# Patient Record
Sex: FEMALE | Race: ASIAN | NOT HISPANIC OR LATINO | URBAN - METROPOLITAN AREA
[De-identification: names, ages, dates, MRNs, and addresses within clinical notes are randomized per-mention and may not be internally consistent; named-entity substitution may affect disease eponyms.]

---

## 2019-06-26 ENCOUNTER — EMERGENCY (EMERGENCY)
Facility: HOSPITAL | Age: 69
LOS: 1 days | Discharge: HOME | End: 2019-06-26
Attending: EMERGENCY MEDICINE | Admitting: EMERGENCY MEDICINE
Payer: MEDICARE

## 2019-06-26 VITALS
OXYGEN SATURATION: 100 % | HEART RATE: 88 BPM | DIASTOLIC BLOOD PRESSURE: 86 MMHG | RESPIRATION RATE: 16 BRPM | SYSTOLIC BLOOD PRESSURE: 148 MMHG | TEMPERATURE: 98 F

## 2019-06-26 PROCEDURE — 99283 EMERGENCY DEPT VISIT LOW MDM: CPT

## 2019-06-26 RX ORDER — OXYCODONE AND ACETAMINOPHEN 5; 325 MG/1; MG/1
2 TABLET ORAL ONCE
Refills: 0 | Status: DISCONTINUED | OUTPATIENT
Start: 2019-06-26 | End: 2019-06-26

## 2019-06-26 RX ADMIN — OXYCODONE AND ACETAMINOPHEN 2 TABLET(S): 5; 325 TABLET ORAL at 11:22

## 2019-06-26 NOTE — ED PROVIDER NOTE - OBJECTIVE STATEMENT
pt c/o 3 weeks of shooting pain down the LLE, has been on steroid dose pack which helped, is scheduled for spinal injection but comes today because of too much pain, pt currently on diclofenac and cyclobenzaprine.  pt has appt with PMD today for set up of spinal injection.  pt also had outpatient ultrasound for r/o DVT which was negative.  pt denies any weakness, ambulating but with pain.  pt w h/o diabetes      Dr. Winn - PMD

## 2019-06-26 NOTE — ED PROVIDER NOTE - PHYSICAL EXAMINATION
Gen:  alert, awake, no acute distress  HEENT:  atraumatic head, airway clear, pupils equal and round  CV:  rrr, nl S1, S2, no m/r/g  Pulm:  lungs CTA b/l  Abd: s/nt/nd, +BS  Ext:  moving all extremities, focal ttp to R buttock  Neuro:  grossly intact, no focal deficits  Skin:  clear, dry, intact  Psych: AOx3, normal affect, no apparent risk to self or others

## 2019-06-26 NOTE — ED PROVIDER NOTE - NSFOLLOWUPINSTRUCTIONS_ED_ALL_ED_FT
-- Follow up with your primary care physician today as scheduled.    -- Return to the ER for worsening or persistent symptoms, and/or ANY NEW OR CONCERNING SYMPTOMS.    -- If you have difficulty following up, please call: [phone] to obtain a [facility] doctor or specialist who takes your insurance in your area.

## 2021-06-22 ENCOUNTER — OUTPATIENT (OUTPATIENT)
Dept: OUTPATIENT SERVICES | Facility: HOSPITAL | Age: 71
LOS: 1 days | Discharge: HOME | End: 2021-06-22
Payer: MEDICARE

## 2021-06-22 VITALS
RESPIRATION RATE: 18 BRPM | TEMPERATURE: 98 F | HEART RATE: 77 BPM | OXYGEN SATURATION: 97 % | HEIGHT: 64 IN | SYSTOLIC BLOOD PRESSURE: 148 MMHG | WEIGHT: 216.05 LBS | DIASTOLIC BLOOD PRESSURE: 72 MMHG

## 2021-06-22 DIAGNOSIS — Z98.49 CATARACT EXTRACTION STATUS, UNSPECIFIED EYE: Chronic | ICD-10-CM

## 2021-06-22 DIAGNOSIS — M17.12 UNILATERAL PRIMARY OSTEOARTHRITIS, LEFT KNEE: ICD-10-CM

## 2021-06-22 DIAGNOSIS — Z01.818 ENCOUNTER FOR OTHER PREPROCEDURAL EXAMINATION: ICD-10-CM

## 2021-06-22 DIAGNOSIS — E11.9 TYPE 2 DIABETES MELLITUS WITHOUT COMPLICATIONS: ICD-10-CM

## 2021-06-22 DIAGNOSIS — E78.5 HYPERLIPIDEMIA, UNSPECIFIED: ICD-10-CM

## 2021-06-22 LAB
A1C WITH ESTIMATED AVERAGE GLUCOSE RESULT: 8.5 % — HIGH (ref 4–5.6)
ANION GAP SERPL CALC-SCNC: 10 MMOL/L — SIGNIFICANT CHANGE UP (ref 5–17)
APTT BLD: 29.6 SEC — SIGNIFICANT CHANGE UP (ref 27.5–35.5)
BASOPHILS # BLD AUTO: 0.01 K/UL — SIGNIFICANT CHANGE UP (ref 0–0.2)
BASOPHILS NFR BLD AUTO: 0.1 % — SIGNIFICANT CHANGE UP (ref 0–2)
BUN SERPL-MCNC: 11 MG/DL — SIGNIFICANT CHANGE UP (ref 7–23)
CALCIUM SERPL-MCNC: 9.2 MG/DL — SIGNIFICANT CHANGE UP (ref 8.5–10.1)
CHLORIDE SERPL-SCNC: 103 MMOL/L — SIGNIFICANT CHANGE UP (ref 96–108)
CO2 SERPL-SCNC: 28 MMOL/L — SIGNIFICANT CHANGE UP (ref 22–31)
CREAT SERPL-MCNC: 0.76 MG/DL — SIGNIFICANT CHANGE UP (ref 0.5–1.3)
EOSINOPHIL # BLD AUTO: 0.08 K/UL — SIGNIFICANT CHANGE UP (ref 0–0.5)
EOSINOPHIL NFR BLD AUTO: 1.1 % — SIGNIFICANT CHANGE UP (ref 0–6)
ESTIMATED AVERAGE GLUCOSE: 197 MG/DL — HIGH (ref 68–114)
GLUCOSE SERPL-MCNC: 91 MG/DL — SIGNIFICANT CHANGE UP (ref 70–99)
HCT VFR BLD CALC: 38.3 % — SIGNIFICANT CHANGE UP (ref 34.5–45)
HGB BLD-MCNC: 12.4 G/DL — SIGNIFICANT CHANGE UP (ref 11.5–15.5)
IMM GRANULOCYTES NFR BLD AUTO: 0.3 % — SIGNIFICANT CHANGE UP (ref 0–1.5)
INR BLD: 1.03 RATIO — SIGNIFICANT CHANGE UP (ref 0.88–1.16)
LYMPHOCYTES # BLD AUTO: 2.8 K/UL — SIGNIFICANT CHANGE UP (ref 1–3.3)
LYMPHOCYTES # BLD AUTO: 40 % — SIGNIFICANT CHANGE UP (ref 13–44)
MCHC RBC-ENTMCNC: 28.2 PG — SIGNIFICANT CHANGE UP (ref 27–34)
MCHC RBC-ENTMCNC: 32.4 GM/DL — SIGNIFICANT CHANGE UP (ref 32–36)
MCV RBC AUTO: 87 FL — SIGNIFICANT CHANGE UP (ref 80–100)
MONOCYTES # BLD AUTO: 0.57 K/UL — SIGNIFICANT CHANGE UP (ref 0–0.9)
MONOCYTES NFR BLD AUTO: 8.1 % — SIGNIFICANT CHANGE UP (ref 2–14)
MRSA PCR RESULT.: SIGNIFICANT CHANGE UP
NEUTROPHILS # BLD AUTO: 3.52 K/UL — SIGNIFICANT CHANGE UP (ref 1.8–7.4)
NEUTROPHILS NFR BLD AUTO: 50.4 % — SIGNIFICANT CHANGE UP (ref 43–77)
NRBC # BLD: 0 /100 WBCS — SIGNIFICANT CHANGE UP (ref 0–0)
PLATELET # BLD AUTO: 215 K/UL — SIGNIFICANT CHANGE UP (ref 150–400)
POTASSIUM SERPL-MCNC: 4.5 MMOL/L — SIGNIFICANT CHANGE UP (ref 3.5–5.3)
POTASSIUM SERPL-SCNC: 4.5 MMOL/L — SIGNIFICANT CHANGE UP (ref 3.5–5.3)
PROTHROM AB SERPL-ACNC: 11.9 SEC — SIGNIFICANT CHANGE UP (ref 10.6–13.6)
RBC # BLD: 4.4 M/UL — SIGNIFICANT CHANGE UP (ref 3.8–5.2)
RBC # FLD: 14.1 % — SIGNIFICANT CHANGE UP (ref 10.3–14.5)
S AUREUS DNA NOSE QL NAA+PROBE: SIGNIFICANT CHANGE UP
SODIUM SERPL-SCNC: 141 MMOL/L — SIGNIFICANT CHANGE UP (ref 135–145)
WBC # BLD: 7 K/UL — SIGNIFICANT CHANGE UP (ref 3.8–10.5)
WBC # FLD AUTO: 7 K/UL — SIGNIFICANT CHANGE UP (ref 3.8–10.5)

## 2021-06-22 PROCEDURE — 73560 X-RAY EXAM OF KNEE 1 OR 2: CPT | Mod: 26,LT

## 2021-06-22 NOTE — PHYSICAL THERAPY INITIAL EVALUATION ADULT - TINETTI BALANCE TEST, REHAB EVAL
Sitting Balance - 1/1 , Rises From Chair -1 /2, Attempts to rise -1 /2 , Immediate Standing Balance -1 /2,  Standing Balance - 2/2, Nudged -  2/2, Eyes Closed - 1/1,  Turning 360 Deg - 1 /2, Sitting down -1 /2, Balance Score - 11/16

## 2021-06-22 NOTE — H&P PST ADULT - NSICDXPROBLEM_GEN_ALL_CORE_FT
PROBLEM DIAGNOSES  Problem: Osteoarthritis of left knee  Assessment and Plan: schedule for left knee arthoplasty    Problem: DM (diabetes mellitus)  Assessment and Plan: continue meds      Problem: HLD (hyperlipidemia)  Assessment and Plan: continue meds      Problem: Preoperative examination  Assessment and Plan: Assessment and Plan: labs - cbc,pt/ptt,bmp,t&s,nose cx,ekg  M/C required  preop 3 day hibiclens instruction reviewed and given .instructed on if  nose cx positive use mupuricin 5 days and checklist given  take routine meds DOS with sips of water. avoid NSAID and OTC supplements. verbalized understanding  information on proper nutrition , increase protein and better food choices provided in packet  anesthesiologist to review pst labs, ekg, medical clearances and optimization for surgery

## 2021-06-22 NOTE — H&P PST ADULT - ASSESSMENT
left knee osteoarthritis   CAPRINI SCORE    AGE RELATED RISK FACTORS                                                       MOBILITY RELATED FACTORS  [ ] Age 41-60 years                                            (1 Point)                  [ ] Bed rest                                                        (1 Point)  [x ] Age: 61-74 years                                           (2 Points)                [ ] Plaster cast                                                   (2 Points)  [ ] Age= 75 years                                              (3 Points)                 [ ] Bed bound for more than 72 hours                   (2 Points)    DISEASE RELATED RISK FACTORS                                               GENDER SPECIFIC FACTORS  [ ] Edema in the lower extremities                       (1 Point)                  [ ] Pregnancy                                                     (1 Point)  [ ] Varicose veins                                               (1 Point)                  [ ] Post-partum < 6 weeks                                   (1 Point)             x[ ] BMI > 25 Kg/m2                                            (1 Point)                  [ ] Hormonal therapy  or oral contraception            (1 Point)                 [ ] Sepsis (in the previous month)                        (1 Point)                  [ ] History of pregnancy complications  [ ] Pneumonia or serious lung disease                                               [ ] Unexplained or recurrent                       (1 Point)           (in the previous month)                               (1 Point)  [ ] Abnormal pulmonary function test                     (1 Point)                 SURGERY RELATED RISK FACTORS  [ ] Acute myocardial infarction                              (1 Point)                 [ ]  Section                                            (1 Point)  [ ] Congestive heart failure (in the previous month)  (1 Point)                 [ ] Minor surgery                                                 (1 Point)   [ ] Inflammatory bowel disease                             (1 Point)                 [ ] Arthroscopic surgery                                        (2 Points)  [ ] Central venous access                                    (2 Points)                [ ] General surgery lasting more than 45 minutes   (2 Points)       [ ] Stroke (in the previous month)                          (5 Points)               [ x] Elective arthroplasty                                        (5 Points)                                                                                                                                               HEMATOLOGY RELATED FACTORS                                                 TRAUMA RELATED RISK FACTORS  [ ] Prior episodes of VTE                                     (3 Points)                 [ ] Fracture of the hip, pelvis, or leg                       (5 Points)  [ ] Positive family history for VTE                         (3 Points)                 [ ] Acute spinal cord injury (in the previous month)  (5 Points)  [ ] Prothrombin 47694 A                                      (3 Points)                 [ ] Paralysis  (less than 1 month)                          (5 Points)  [ ] Factor V Leiden                                             (3 Points)                 [ ] Multiple Trauma within 1 month                         (5 Points)  [ ] Lupus anticoagulants                                     (3 Points)                                                           [ ] Anticardiolipin antibodies                                (3 Points)                                                       [ ] High homocysteine in the blood                      (3 Points)                                             [ ] Other congenital or acquired thrombophilia       (3 Points)                                                [ ] Heparin induced thrombocytopenia                  (3 Points)                                          Total Score [     8     ]  Caprini Score 0-2: Low risk, No VTE Prophylaxis required for most patient's, encourage ambulation  Caprini Score 3-6: At Risk, Pharmacologic VTE prophylaxis is indicated for most patients ( in the absence of a contraindication)  Caprini Score Greater than or = 7: High Risk , pharmacologic VTE is indicated for most patients ( in the absence of a contraindication)    Caprini score indicates that the patient is high risk for VTE event ( score 6 or greater). Surgical patient's in this group will benefit from both pharmacologic prophylaxis and intermittent compression devices . Surgical team will determine the balance between VTE  risk and bleeding risk and other clinical considerations

## 2021-06-22 NOTE — OCCUPATIONAL THERAPY INITIAL EVALUATION ADULT - ADDITIONAL COMMENTS
Pt lives in a private home c  and granddaughter (able to assist upon d/c) c 6 stairs to enter c bilateral handrails. Once inside, pt has a full flight to reach bedroom but will reside on the main level in living room post-operatively. Pt bathroom has walk-in shower, retractable shower head, and standard height toilet seat. Pt reported a 3:1 commode will not fit in downstairs bathroom, but will fit in the BR upstairs. Pt reported daily pain as 3/10 at rest and 8/10 c ambulation and stairs. Pt currently utilizes narcotics to alleviate pain. Pt lives in a private home c  and granddaughter (able to assist upon d/c) c 6 stairs to enter c bilateral handrails. Once inside, pt has a full flight to reach bedroom but will reside on the main level in living room post-operatively. Pt bathroom has walk-in shower, retractable shower head, and standard height toilet seat. Pt reported a 3:1 commode will not fit in downstairs bathroom, but will fit in the BR upstairs. Pt reported daily pain as 3/10 at rest and 8/10 c ambulation and stairs. Pt currently utilizes narcotics to alleviate pain. Pt is ambidextrous (alternates R/L hand depending on task), wears glasses for reading & distance, and currently does not drive.

## 2021-06-22 NOTE — H&P PST ADULT - NSICDXPASTMEDICALHX_GEN_ALL_CORE_FT
PAST MEDICAL HISTORY:  DM (diabetes mellitus)      PAST MEDICAL HISTORY:  DM (diabetes mellitus)     HLD (hyperlipidemia)

## 2021-06-22 NOTE — PHYSICAL THERAPY INITIAL EVALUATION ADULT - GENERAL OBSERVATIONS, REHAB EVAL
Patient was seen seated  in chair in PT/OT preoperative assessment room with swelling at blu ankle and feet and no apparent distress. Height:  5'5'     ; weight : 216    lbs. v

## 2021-06-22 NOTE — PHYSICAL THERAPY INITIAL EVALUATION ADULT - ADDITIONAL COMMENTS
There are 6 steps, c far blu rails, at the entry of the house and one flight od steps to negotiate to go to the second floor bedroom. However, pt will set up a bed in the living room and stay on the main floor after surgery.  Pt has a walk in shower c retractable shower head,  no grab bar, and regular toilet seat in BR. 3-1 commode will fit in BR on the second floor. Pt will purchase the raised toilet seat c arms for the main floor bath room. Average pain level at rest is 3/10. Pain increases to 8/10 c ambulation and stairs negotiation. Pt endorses taking narcotics for pain management. Pt has no experience of adverse effects with pain medication. Pt is not on out-pt physical therapy at present. There is no h/o fall but L  knee marcellus sometimes. Pt wears glasses for reading and distance and no need for hearing aid. Pt dose not drive at present.

## 2021-06-22 NOTE — PHYSICAL THERAPY INITIAL EVALUATION ADULT - PERTINENT HX OF CURRENT PROBLEM, REHAB EVAL
Chronic L knee pain and  limiting functional mobility. Pt is scheduled for elective L knee total joint replacement on 7/12/21  by  Dr. Hernandez.

## 2021-06-22 NOTE — PHYSICAL THERAPY INITIAL EVALUATION ADULT - GAIT DEVIATIONS NOTED, PT EVAL
c L knee in full extension all the times/decreased trang/increased time in double stance/decreased velocity of limb motion/decreased step length/decreased stride length/decreased weight-shifting ability

## 2021-06-22 NOTE — H&P PST ADULT - BIRTH SEX
[Mother] : mother [1%] : 1%  milk [Fruit] : fruit [Vegetables] : vegetables [Meat] : meat [Grains] : grains [Eggs] : eggs [Fish] : fish [Dairy] : dairy [Vitamins] : takes vitamins  [Eats healthy meals and snacks] : eats healthy meals and snacks [Eats meals with family] : eats meals with family [___ stools per day] : [unfilled]  stools per day [Toilet Trained] : toilet trained [In own bed] : In own bed [Sleeps ___ hours per night] : sleeps [unfilled] hours per night [Brushing teeth twice/d] : brushing teeth twice per day [Goes to dentist twice per year] : goes to dentist twice per year [Playtime (60 min/d)] : playtime 60 min a day [Appropiate parent-child-sibling interaction] : appropriate parent-child-sibling interaction [Does chores when asked] : does chores when asked [Has Friends] : has friends [Grade ___] : Grade [unfilled] [Adequate social interactions] : adequate social interactions [Adequate behavior] : adequate behavior [Adequate performance] : adequate performance [Adequate attention] : adequate attention [No difficulties with Homework] : no difficulties with homework [Appropriately restrained in motor vehicle] : appropriately restrained in motor vehicle Female [Supervised outdoor play] : supervised outdoor play [Supervised around water] : supervised around water [Wears helmet and pads] : wears helmet and pads [Parent knows child's friends] : parent knows child's friends [Parent discusses safety rules regarding adults] : parent discusses safety rules regarding adults [Family discusses home emergency plan] : family discusses home emergency plan [Monitored computer use] : monitored computer use [Up to date] : Up to date [Gun in Home] : no gun in home [Cigarette smoke exposure] : no cigarette smoke exposure [FreeTextEntry8] : no enuresis

## 2021-06-22 NOTE — OCCUPATIONAL THERAPY INITIAL EVALUATION ADULT - ANTICIPATED DISCHARGE DISPOSITION, OT EVAL
Home with home OT for home safety evaluation and to improve functional ADLs and transfers/mobility. Recommending rolling walker and 3:1 commode for safe transfers and ADL management.

## 2021-06-22 NOTE — PHYSICAL THERAPY INITIAL EVALUATION ADULT - TINETTI GAIT TEST, REHAB EVAL
Indication of gait -0/1,   Step Length and height -1/2,   Foot Clearance -2/2,   Step Symmetry - 0/1,  Step Continuity -1/1,   Path -1/ 2,   Trunk -1/2,   Walking Time -1/1,   Total Score -7 /12

## 2021-06-22 NOTE — PHYSICAL THERAPY INITIAL EVALUATION ADULT - LIVES WITH, PROFILE
and granddaughter in a private house.  will be available to assist pt in post -op care upon discharge home./spouse

## 2021-06-22 NOTE — H&P PST ADULT - HISTORY OF PRESENT ILLNESS
70 yo female, PMHx - dm, hld  c/o left knee pain secondary to osteoarthritis - scheduled for left knee arthroplasty    goal: to walk without pain    denies recent travels in the past 30 days. No fever, SOB, cough, flu like symptoms or body rash- covid screen  completed covid19 vaccine

## 2021-06-22 NOTE — PHYSICAL THERAPY INITIAL EVALUATION ADULT - IMPAIRMENTS FOUND, PT EVAL
aerobic capacity/endurance/ergonomics and body mechanics/gait, locomotion, and balance/joint integrity and mobility/muscle strength/posture/ROM

## 2021-06-22 NOTE — OCCUPATIONAL THERAPY INITIAL EVALUATION ADULT - GENERAL OBSERVATIONS, REHAB EVAL
Pt is a 70 y/o female slated for elective surgery for right TKR with MD Hernandez on 7/12/21 due to OA and chronic pain. Pt denied any falls in the past 3-6 months. Pt reported occasional buckling in LLE and numbness in feet due to dx of diabetes. Pt is a 70 y/o female slated for elective surgery for left TKR with MD Hernandez on 7/12/21 due to OA and chronic pain. Pt denied any falls in the past 3-6 months. Pt reported occasional buckling in LLE and numbness in feet due to dx of diabetes.

## 2021-07-06 PROCEDURE — 93010 ELECTROCARDIOGRAM REPORT: CPT

## 2021-07-28 PROBLEM — E11.9 TYPE 2 DIABETES MELLITUS WITHOUT COMPLICATIONS: Chronic | Status: ACTIVE | Noted: 2021-06-22

## 2021-07-28 PROBLEM — E78.5 HYPERLIPIDEMIA, UNSPECIFIED: Chronic | Status: ACTIVE | Noted: 2021-06-22

## 2021-07-29 ENCOUNTER — OUTPATIENT (OUTPATIENT)
Dept: OUTPATIENT SERVICES | Facility: HOSPITAL | Age: 71
LOS: 1 days | Discharge: HOME | End: 2021-07-29

## 2021-07-29 VITALS
HEIGHT: 60 IN | OXYGEN SATURATION: 97 % | DIASTOLIC BLOOD PRESSURE: 85 MMHG | HEART RATE: 76 BPM | TEMPERATURE: 98 F | SYSTOLIC BLOOD PRESSURE: 174 MMHG | RESPIRATION RATE: 16 BRPM | WEIGHT: 218.7 LBS

## 2021-07-29 DIAGNOSIS — Z98.49 CATARACT EXTRACTION STATUS, UNSPECIFIED EYE: Chronic | ICD-10-CM

## 2021-07-29 DIAGNOSIS — M17.12 UNILATERAL PRIMARY OSTEOARTHRITIS, LEFT KNEE: ICD-10-CM

## 2021-07-29 DIAGNOSIS — Z01.818 ENCOUNTER FOR OTHER PREPROCEDURAL EXAMINATION: ICD-10-CM

## 2021-07-29 DIAGNOSIS — E11.9 TYPE 2 DIABETES MELLITUS WITHOUT COMPLICATIONS: ICD-10-CM

## 2021-07-29 DIAGNOSIS — E78.5 HYPERLIPIDEMIA, UNSPECIFIED: ICD-10-CM

## 2021-07-29 LAB
A1C WITH ESTIMATED AVERAGE GLUCOSE RESULT: 7.5 % — HIGH (ref 4–5.6)
ANION GAP SERPL CALC-SCNC: 5 MMOL/L — SIGNIFICANT CHANGE UP (ref 5–17)
APTT BLD: 32.5 SEC — SIGNIFICANT CHANGE UP (ref 27.5–35.5)
BASOPHILS # BLD AUTO: 0.01 K/UL — SIGNIFICANT CHANGE UP (ref 0–0.2)
BASOPHILS NFR BLD AUTO: 0.1 % — SIGNIFICANT CHANGE UP (ref 0–2)
BUN SERPL-MCNC: 13 MG/DL — SIGNIFICANT CHANGE UP (ref 7–23)
CALCIUM SERPL-MCNC: 9.2 MG/DL — SIGNIFICANT CHANGE UP (ref 8.5–10.1)
CHLORIDE SERPL-SCNC: 107 MMOL/L — SIGNIFICANT CHANGE UP (ref 96–108)
CO2 SERPL-SCNC: 29 MMOL/L — SIGNIFICANT CHANGE UP (ref 22–31)
CREAT SERPL-MCNC: 0.84 MG/DL — SIGNIFICANT CHANGE UP (ref 0.5–1.3)
EOSINOPHIL # BLD AUTO: 0.02 K/UL — SIGNIFICANT CHANGE UP (ref 0–0.5)
EOSINOPHIL NFR BLD AUTO: 0.3 % — SIGNIFICANT CHANGE UP (ref 0–6)
ESTIMATED AVERAGE GLUCOSE: 169 MG/DL — HIGH (ref 68–114)
GLUCOSE SERPL-MCNC: 107 MG/DL — HIGH (ref 70–99)
HCT VFR BLD CALC: 40.6 % — SIGNIFICANT CHANGE UP (ref 34.5–45)
HGB BLD-MCNC: 12.7 G/DL — SIGNIFICANT CHANGE UP (ref 11.5–15.5)
IMM GRANULOCYTES NFR BLD AUTO: 0.3 % — SIGNIFICANT CHANGE UP (ref 0–1.5)
INR BLD: 1.01 RATIO — SIGNIFICANT CHANGE UP (ref 0.88–1.16)
LYMPHOCYTES # BLD AUTO: 1.75 K/UL — SIGNIFICANT CHANGE UP (ref 1–3.3)
LYMPHOCYTES # BLD AUTO: 25.7 % — SIGNIFICANT CHANGE UP (ref 13–44)
MCHC RBC-ENTMCNC: 28.4 PG — SIGNIFICANT CHANGE UP (ref 27–34)
MCHC RBC-ENTMCNC: 31.3 GM/DL — LOW (ref 32–36)
MCV RBC AUTO: 90.8 FL — SIGNIFICANT CHANGE UP (ref 80–100)
MONOCYTES # BLD AUTO: 0.37 K/UL — SIGNIFICANT CHANGE UP (ref 0–0.9)
MONOCYTES NFR BLD AUTO: 5.4 % — SIGNIFICANT CHANGE UP (ref 2–14)
MRSA PCR RESULT.: SIGNIFICANT CHANGE UP
NEUTROPHILS # BLD AUTO: 4.64 K/UL — SIGNIFICANT CHANGE UP (ref 1.8–7.4)
NEUTROPHILS NFR BLD AUTO: 68.2 % — SIGNIFICANT CHANGE UP (ref 43–77)
NRBC # BLD: 0 /100 WBCS — SIGNIFICANT CHANGE UP (ref 0–0)
PLATELET # BLD AUTO: 231 K/UL — SIGNIFICANT CHANGE UP (ref 150–400)
POTASSIUM SERPL-MCNC: 4.8 MMOL/L — SIGNIFICANT CHANGE UP (ref 3.5–5.3)
POTASSIUM SERPL-SCNC: 4.8 MMOL/L — SIGNIFICANT CHANGE UP (ref 3.5–5.3)
PROTHROM AB SERPL-ACNC: 11.7 SEC — SIGNIFICANT CHANGE UP (ref 10.6–13.6)
RBC # BLD: 4.47 M/UL — SIGNIFICANT CHANGE UP (ref 3.8–5.2)
RBC # FLD: 14.5 % — SIGNIFICANT CHANGE UP (ref 10.3–14.5)
S AUREUS DNA NOSE QL NAA+PROBE: SIGNIFICANT CHANGE UP
SODIUM SERPL-SCNC: 141 MMOL/L — SIGNIFICANT CHANGE UP (ref 135–145)
WBC # BLD: 6.81 K/UL — SIGNIFICANT CHANGE UP (ref 3.8–10.5)
WBC # FLD AUTO: 6.81 K/UL — SIGNIFICANT CHANGE UP (ref 3.8–10.5)

## 2021-07-29 NOTE — OCCUPATIONAL THERAPY INITIAL EVALUATION ADULT - ANTICIPATED DISCHARGE DISPOSITION, OT EVAL
Home with home OT for home safety evaluation and to improve functional ADLs and transfers/mobility. Pt has rolling walker, 3:1 commode and raised toilet seat with arms.

## 2021-07-29 NOTE — H&P PST ADULT - IS PATIENT PREGNANT?
Treating ADHD: Learning New Behaviors  A child with ADHD often acts up and tunes out. But you can show your child new ways to react to the world. This process takes time and practice. Working with a counselor may help.    Coping skills  What things upset your child? Perhaps having to do chores or share toys sanders poor behavior. Try to work with your child each day. Assign a simple task. Or talk with your child about the tips below. Show your child how to respond to frustration and anger in useful ways. This can help him or her learn self-control.  Reinforcing success  Children with ADHD have trouble learning from past events. Positive feedback helps make lessons stick. Offer praise when a job is well done. This helps your child stacey the moment in his or her mind. Place a sticker on a reward chart to celebrate each success.  Parents role  Here are some ways you can help:  · Teach coping skills after your child has taken a dose of medication. Learning is more likely to occur at such times.  · Praise your childs success. Offer a smile and a hug, a positive comment, or a small reward.  · Set clear rules. Explain what will be taken away if those rules are not followed. Then, follow through.  · Try to stick to a routine. Prepare your child for any change in that routine.  · Help your child stay focused. For instance, avoid crowded, noisy places if they bother your child. Also, limit choices.  Childs role  Here are some hints for your child:  · Try out new ways of dealing with people and places that bother you. When you are upset, you might talk, draw, write, throw a ball, or spend some time alone.  · Act like a STAR: Stop, Think, Act, and then Review.  Date Last Reviewed: 12/22/2014  © 1055-1498 [employer]. [address] [place] [address], [place] [address]. All rights reserved. This information is not intended as a substitute for professional medical care. Always follow your healthcare professional's  instructions.         no

## 2021-07-29 NOTE — OCCUPATIONAL THERAPY INITIAL EVALUATION ADULT - ADDITIONAL COMMENTS
Pt lives in a private home c  and granddaughter (able to assist upon d/c) c 6 stairs to enter c bilateral handrails. Once inside, pt has a full flight c B/L close handrails to reach bedroom but will reside on the main level in living room post-operatively. Pt bathroom has walk-in shower, fixed shower head, and standard height toilet seat. Pt reported she purchased raised toilet seat c arms for down stairs bathroom and use 3:1 commode upstairs if able. Pt reported daily pain as 3/10 at rest and 8/10 c ambulation and stairs. Pt uses rest to alleviate pain at this time does no utilize narcotics. Pt is ambidextrous (alternates R/L hand depending on task), wears glasses for reading & distance, and currently does not drive.

## 2021-07-29 NOTE — H&P PST ADULT - HISTORY OF PRESENT ILLNESS
70 yo female, PMHx - dm, hld  c/o left knee pain secondary to osteoarthritis - scheduled for left knee arthroplasty    goal: to walk without pain    denies recent travels in the past 30 days. No fever, SOB, cough, flu like symptoms or body rash- covid screen  completed covid19 vaccine   72 yo female, PMHx - dm, hld  c/o left knee pain secondary to osteoarthritis - scheduled for left knee arthroplasty  goal: to walk without pain  denies recent travels in the past 30 days. No fever, SOB, cough, flu like symptoms or body rash- covid screen  completed covid19 vaccine  patient  was scheduled for surgery in jUly cancelled secondarty to uncontrolled dm was started on insulin . patient stated she was light headed this morning  checked her sugar and it was 45 at home - she ate a piece of candy and toast . she felt abit dizzy here  was give orange juice and crackers - blood work done . spoke with patient  PMD Dr. Rea informed and advised  to decrease insulin dose  which was explained to patient .

## 2021-07-29 NOTE — H&P PST ADULT - ATTENDING COMMENTS
I WENT OVER ALL POSSIBLE RISKS OF LEFT TKA.  PT WISHES TO PROCEED NO GUARANTEES MADE ALL QUESTIONS ANSWERED

## 2021-07-29 NOTE — H&P PST ADULT - ASSESSMENT
left knee osteoarthritis   CAPRINI SCORE    AGE RELATED RISK FACTORS                                                       MOBILITY RELATED FACTORS  [ ] Age 41-60 years                                            (1 Point)                  [ ] Bed rest                                                        (1 Point)  [x ] Age: 61-74 years                                           (2 Points)                [ ] Plaster cast                                                   (2 Points)  [ ] Age= 75 years                                              (3 Points)                 [ ] Bed bound for more than 72 hours                   (2 Points)    DISEASE RELATED RISK FACTORS                                               GENDER SPECIFIC FACTORS  [ ] Edema in the lower extremities                       (1 Point)                  [ ] Pregnancy                                                     (1 Point)  [ ] Varicose veins                                               (1 Point)                  [ ] Post-partum < 6 weeks                                   (1 Point)             x[ ] BMI > 25 Kg/m2                                            (1 Point)                  [ ] Hormonal therapy  or oral contraception            (1 Point)                 [ ] Sepsis (in the previous month)                        (1 Point)                  [ ] History of pregnancy complications  [ ] Pneumonia or serious lung disease                                               [ ] Unexplained or recurrent                       (1 Point)           (in the previous month)                               (1 Point)  [ ] Abnormal pulmonary function test                     (1 Point)                 SURGERY RELATED RISK FACTORS  [ ] Acute myocardial infarction                              (1 Point)                 [ ]  Section                                            (1 Point)  [ ] Congestive heart failure (in the previous month)  (1 Point)                 [ ] Minor surgery                                                 (1 Point)   [ ] Inflammatory bowel disease                             (1 Point)                 [ ] Arthroscopic surgery                                        (2 Points)  [ ] Central venous access                                    (2 Points)                [ ] General surgery lasting more than 45 minutes   (2 Points)       [ ] Stroke (in the previous month)                          (5 Points)               [ x] Elective arthroplasty                                        (5 Points)                                                                                                                                               HEMATOLOGY RELATED FACTORS                                                 TRAUMA RELATED RISK FACTORS  [ ] Prior episodes of VTE                                     (3 Points)                 [ ] Fracture of the hip, pelvis, or leg                       (5 Points)  [ ] Positive family history for VTE                         (3 Points)                 [ ] Acute spinal cord injury (in the previous month)  (5 Points)  [ ] Prothrombin 15410 A                                      (3 Points)                 [ ] Paralysis  (less than 1 month)                          (5 Points)  [ ] Factor V Leiden                                             (3 Points)                 [ ] Multiple Trauma within 1 month                         (5 Points)  [ ] Lupus anticoagulants                                     (3 Points)                                                           [ ] Anticardiolipin antibodies                                (3 Points)                                                       [ ] High homocysteine in the blood                      (3 Points)                                             [ ] Other congenital or acquired thrombophilia       (3 Points)                                                [ ] Heparin induced thrombocytopenia                  (3 Points)                                          Total Score [     8     ]  Caprini Score 0-2: Low risk, No VTE Prophylaxis required for most patient's, encourage ambulation  Caprini Score 3-6: At Risk, Pharmacologic VTE prophylaxis is indicated for most patients ( in the absence of a contraindication)  Caprini Score Greater than or = 7: High Risk , pharmacologic VTE is indicated for most patients ( in the absence of a contraindication)    Caprini score indicates that the patient is high risk for VTE event ( score 6 or greater). Surgical patient's in this group will benefit from both pharmacologic prophylaxis and intermittent compression devices . Surgical team will determine the balance between VTE  risk and bleeding risk and other clinical considerations

## 2021-07-29 NOTE — OCCUPATIONAL THERAPY INITIAL EVALUATION ADULT - RANGE OF MOTION EXAMINATION, LOWER EXTREMITY
Right LE Active ROM was WFL   (within functional limits); Left LE AROM decreased grossly (0) swallows foods/liquids without difficulty

## 2021-07-29 NOTE — OCCUPATIONAL THERAPY INITIAL EVALUATION ADULT - GENERAL OBSERVATIONS, REHAB EVAL
Pt is a 72 y/o female slated for elective surgery for left TKR with MD Hernandez on 7/12/21 due to OA and chronic pain. Pt denied any falls in the past 3-6 months. Pt reported occasional buckling in LLE and numbness in L foot due to dx of diabetes. Pt is a 70 y/o female slated for elective surgery for left TKR with MD Hernandez on 8/09/21 due to OA and chronic pain. Pt denied any falls in the past 3-6 months. Pt reported occasional buckling in LLE and numbness in L foot due to dx of diabetes.

## 2021-07-29 NOTE — H&P PST ADULT - NSICDXPROBLEM_GEN_ALL_CORE_FT
PROBLEM DIAGNOSES  Problem: Osteoarthritis of left knee  Assessment and Plan: schedule for left knee arthoplasty    Problem: DM (diabetes mellitus)  Assessment and Plan: continue meds      Problem: HLD (hyperlipidemia)  Assessment and Plan: continue meds      Problem: Preoperative examination  Assessment and Plan: Assessment and Plan: labs - cbc,pt/ptt,bmp,t&s,nose cx,ekg  M/C required  preop 3 day hibiclens instruction reviewed and given .instructed on if  nose cx positive use mupuricin 5 days and checklist given  take routine meds DOS with sips of water. avoid NSAID and OTC supplements. verbalized understanding  information on proper nutrition , increase protein and better food choices provided in packet  anesthesiologist to review pst labs, ekg, medical clearances and optimization for surgery         PROBLEM DIAGNOSES  Problem: Osteoarthritis of left knee  Assessment and Plan: scheduled for left knee arthroplasty    Problem: HLD (hyperlipidemia)  Assessment and Plan: continue meds      Problem: Preoperative examination  Assessment and Plan: Assessment and Plan: labs - cbc,pt/ptt,bmp,t&s,nose cx,ekg  M/C required  preop 3 day hibiclens instruction reviewed and given .instructed on if  nose cx positive use mupuricin 5 days and checklist given  take routine meds DOS with sips of water. avoid NSAID and OTC supplements. verbalized understanding  information on proper nutrition , increase protein and better food choices provided in packet  anesthesiologist to review pst labs, ekg, medical clearances and optimization for surgery      Problem: DM (diabetes mellitus)  Assessment and Plan: basaglar dose reduced to 30 units twice a day

## 2021-07-29 NOTE — H&P PST ADULT - NSANTHOSAYNRD_GEN_A_CORE
No. LARA screening performed.  STOP BANG Legend: 0-2 = LOW Risk; 3-4 = INTERMEDIATE Risk; 5-8 = HIGH Risk

## 2021-08-06 ENCOUNTER — OUTPATIENT (OUTPATIENT)
Dept: OUTPATIENT SERVICES | Facility: HOSPITAL | Age: 71
LOS: 1 days | Discharge: HOME | End: 2021-08-06

## 2021-08-06 DIAGNOSIS — U07.1 COVID-19: ICD-10-CM

## 2021-08-06 DIAGNOSIS — Z98.49 CATARACT EXTRACTION STATUS, UNSPECIFIED EYE: Chronic | ICD-10-CM

## 2021-08-06 LAB
FLUAV AG NPH QL: SIGNIFICANT CHANGE UP
FLUBV AG NPH QL: SIGNIFICANT CHANGE UP
SARS-COV-2 RNA SPEC QL NAA+PROBE: SIGNIFICANT CHANGE UP

## 2021-08-09 ENCOUNTER — INPATIENT (INPATIENT)
Facility: HOSPITAL | Age: 71
LOS: 0 days | Discharge: HOME HEALTH | End: 2021-08-10
Attending: ORTHOPAEDIC SURGERY | Admitting: ORTHOPAEDIC SURGERY
Payer: MEDICARE

## 2021-08-09 VITALS
HEIGHT: 64 IN | SYSTOLIC BLOOD PRESSURE: 146 MMHG | WEIGHT: 216.05 LBS | TEMPERATURE: 98 F | RESPIRATION RATE: 17 BRPM | HEART RATE: 73 BPM | DIASTOLIC BLOOD PRESSURE: 82 MMHG | OXYGEN SATURATION: 97 %

## 2021-08-09 DIAGNOSIS — Z98.49 CATARACT EXTRACTION STATUS, UNSPECIFIED EYE: Chronic | ICD-10-CM

## 2021-08-09 DIAGNOSIS — M17.12 UNILATERAL PRIMARY OSTEOARTHRITIS, LEFT KNEE: ICD-10-CM

## 2021-08-09 LAB
ANION GAP SERPL CALC-SCNC: 6 MMOL/L — SIGNIFICANT CHANGE UP (ref 5–17)
BUN SERPL-MCNC: 10 MG/DL — SIGNIFICANT CHANGE UP (ref 7–23)
CALCIUM SERPL-MCNC: 8.5 MG/DL — SIGNIFICANT CHANGE UP (ref 8.5–10.1)
CHLORIDE SERPL-SCNC: 108 MMOL/L — SIGNIFICANT CHANGE UP (ref 96–108)
CO2 SERPL-SCNC: 27 MMOL/L — SIGNIFICANT CHANGE UP (ref 22–31)
CREAT SERPL-MCNC: 0.71 MG/DL — SIGNIFICANT CHANGE UP (ref 0.5–1.3)
FLUAV AG NPH QL: SIGNIFICANT CHANGE UP
FLUBV AG NPH QL: SIGNIFICANT CHANGE UP
GLUCOSE BLDC GLUCOMTR-MCNC: 238 MG/DL — HIGH (ref 70–99)
GLUCOSE BLDC GLUCOMTR-MCNC: 285 MG/DL — HIGH (ref 70–99)
GLUCOSE BLDC GLUCOMTR-MCNC: 289 MG/DL — HIGH (ref 70–99)
GLUCOSE BLDC GLUCOMTR-MCNC: 41 MG/DL — CRITICAL LOW (ref 70–99)
GLUCOSE BLDC GLUCOMTR-MCNC: 46 MG/DL — CRITICAL LOW (ref 70–99)
GLUCOSE BLDC GLUCOMTR-MCNC: 51 MG/DL — CRITICAL LOW (ref 70–99)
GLUCOSE BLDC GLUCOMTR-MCNC: 60 MG/DL — LOW (ref 70–99)
GLUCOSE BLDC GLUCOMTR-MCNC: 73 MG/DL — SIGNIFICANT CHANGE UP (ref 70–99)
GLUCOSE SERPL-MCNC: 76 MG/DL — SIGNIFICANT CHANGE UP (ref 70–99)
HCT VFR BLD CALC: 37.3 % — SIGNIFICANT CHANGE UP (ref 34.5–45)
HGB BLD-MCNC: 11.5 G/DL — SIGNIFICANT CHANGE UP (ref 11.5–15.5)
MCHC RBC-ENTMCNC: 27.9 PG — SIGNIFICANT CHANGE UP (ref 27–34)
MCHC RBC-ENTMCNC: 30.8 GM/DL — LOW (ref 32–36)
MCV RBC AUTO: 90.5 FL — SIGNIFICANT CHANGE UP (ref 80–100)
NRBC # BLD: 0 /100 WBCS — SIGNIFICANT CHANGE UP (ref 0–0)
PLATELET # BLD AUTO: 192 K/UL — SIGNIFICANT CHANGE UP (ref 150–400)
POTASSIUM SERPL-MCNC: 5 MMOL/L — SIGNIFICANT CHANGE UP (ref 3.5–5.3)
POTASSIUM SERPL-SCNC: 5 MMOL/L — SIGNIFICANT CHANGE UP (ref 3.5–5.3)
RBC # BLD: 4.12 M/UL — SIGNIFICANT CHANGE UP (ref 3.8–5.2)
RBC # FLD: 14.3 % — SIGNIFICANT CHANGE UP (ref 10.3–14.5)
SARS-COV-2 RNA SPEC QL NAA+PROBE: SIGNIFICANT CHANGE UP
SODIUM SERPL-SCNC: 141 MMOL/L — SIGNIFICANT CHANGE UP (ref 135–145)
WBC # BLD: 9.69 K/UL — SIGNIFICANT CHANGE UP (ref 3.8–10.5)
WBC # FLD AUTO: 9.69 K/UL — SIGNIFICANT CHANGE UP (ref 3.8–10.5)

## 2021-08-09 PROCEDURE — 88305 TISSUE EXAM BY PATHOLOGIST: CPT | Mod: 26

## 2021-08-09 PROCEDURE — 88311 DECALCIFY TISSUE: CPT | Mod: 26

## 2021-08-09 PROCEDURE — 73560 X-RAY EXAM OF KNEE 1 OR 2: CPT | Mod: 26,LT

## 2021-08-09 RX ORDER — HYDROMORPHONE HYDROCHLORIDE 2 MG/ML
0.5 INJECTION INTRAMUSCULAR; INTRAVENOUS; SUBCUTANEOUS ONCE
Refills: 0 | Status: DISCONTINUED | OUTPATIENT
Start: 2021-08-09 | End: 2021-08-10

## 2021-08-09 RX ORDER — ATORVASTATIN CALCIUM 80 MG/1
20 TABLET, FILM COATED ORAL AT BEDTIME
Refills: 0 | Status: DISCONTINUED | OUTPATIENT
Start: 2021-08-09 | End: 2021-08-10

## 2021-08-09 RX ORDER — SODIUM CHLORIDE 9 MG/ML
1000 INJECTION, SOLUTION INTRAVENOUS
Refills: 0 | Status: DISCONTINUED | OUTPATIENT
Start: 2021-08-09 | End: 2021-08-10

## 2021-08-09 RX ORDER — MAGNESIUM HYDROXIDE 400 MG/1
30 TABLET, CHEWABLE ORAL DAILY
Refills: 0 | Status: DISCONTINUED | OUTPATIENT
Start: 2021-08-09 | End: 2021-08-10

## 2021-08-09 RX ORDER — DEXTROSE 50 % IN WATER 50 %
25 SYRINGE (ML) INTRAVENOUS ONCE
Refills: 0 | Status: DISCONTINUED | OUTPATIENT
Start: 2021-08-09 | End: 2021-08-10

## 2021-08-09 RX ORDER — ASPIRIN/CALCIUM CARB/MAGNESIUM 324 MG
1 TABLET ORAL
Qty: 28 | Refills: 0
Start: 2021-08-09 | End: 2021-09-05

## 2021-08-09 RX ORDER — KETOROLAC TROMETHAMINE 30 MG/ML
15 SYRINGE (ML) INJECTION EVERY 6 HOURS
Refills: 0 | Status: DISCONTINUED | OUTPATIENT
Start: 2021-08-09 | End: 2021-08-10

## 2021-08-09 RX ORDER — SODIUM CHLORIDE 9 MG/ML
1000 INJECTION, SOLUTION INTRAVENOUS
Refills: 0 | Status: DISCONTINUED | OUTPATIENT
Start: 2021-08-09 | End: 2021-08-09

## 2021-08-09 RX ORDER — OXYCODONE HYDROCHLORIDE 5 MG/1
10 TABLET ORAL
Refills: 0 | Status: DISCONTINUED | OUTPATIENT
Start: 2021-08-09 | End: 2021-08-10

## 2021-08-09 RX ORDER — INSULIN LISPRO 100/ML
VIAL (ML) SUBCUTANEOUS
Refills: 0 | Status: DISCONTINUED | OUTPATIENT
Start: 2021-08-09 | End: 2021-08-09

## 2021-08-09 RX ORDER — ACETAMINOPHEN 500 MG
1000 TABLET ORAL ONCE
Refills: 0 | Status: DISCONTINUED | OUTPATIENT
Start: 2021-08-09 | End: 2021-08-10

## 2021-08-09 RX ORDER — GLUCAGON INJECTION, SOLUTION 0.5 MG/.1ML
1 INJECTION, SOLUTION SUBCUTANEOUS ONCE
Refills: 0 | Status: DISCONTINUED | OUTPATIENT
Start: 2021-08-09 | End: 2021-08-10

## 2021-08-09 RX ORDER — ASPIRIN/CALCIUM CARB/MAGNESIUM 324 MG
325 TABLET ORAL
Refills: 0 | Status: DISCONTINUED | OUTPATIENT
Start: 2021-08-10 | End: 2021-08-10

## 2021-08-09 RX ORDER — ONDANSETRON 8 MG/1
4 TABLET, FILM COATED ORAL EVERY 6 HOURS
Refills: 0 | Status: DISCONTINUED | OUTPATIENT
Start: 2021-08-09 | End: 2021-08-10

## 2021-08-09 RX ORDER — INSULIN LISPRO 100/ML
VIAL (ML) SUBCUTANEOUS AT BEDTIME
Refills: 0 | Status: DISCONTINUED | OUTPATIENT
Start: 2021-08-09 | End: 2021-08-10

## 2021-08-09 RX ORDER — CELECOXIB 200 MG/1
200 CAPSULE ORAL EVERY 12 HOURS
Refills: 0 | Status: DISCONTINUED | OUTPATIENT
Start: 2021-08-10 | End: 2021-08-10

## 2021-08-09 RX ORDER — DEXTROSE 50 % IN WATER 50 %
12.5 SYRINGE (ML) INTRAVENOUS ONCE
Refills: 0 | Status: DISCONTINUED | OUTPATIENT
Start: 2021-08-09 | End: 2021-08-10

## 2021-08-09 RX ORDER — TRAMADOL HYDROCHLORIDE 50 MG/1
50 TABLET ORAL EVERY 6 HOURS
Refills: 0 | Status: DISCONTINUED | OUTPATIENT
Start: 2021-08-09 | End: 2021-08-09

## 2021-08-09 RX ORDER — GABAPENTIN 400 MG/1
300 CAPSULE ORAL THREE TIMES A DAY
Refills: 0 | Status: DISCONTINUED | OUTPATIENT
Start: 2021-08-09 | End: 2021-08-10

## 2021-08-09 RX ORDER — LANOLIN ALCOHOL/MO/W.PET/CERES
3 CREAM (GRAM) TOPICAL AT BEDTIME
Refills: 0 | Status: DISCONTINUED | OUTPATIENT
Start: 2021-08-09 | End: 2021-08-10

## 2021-08-09 RX ORDER — CEFAZOLIN SODIUM 1 G
2000 VIAL (EA) INJECTION EVERY 8 HOURS
Refills: 0 | Status: DISCONTINUED | OUTPATIENT
Start: 2021-08-09 | End: 2021-08-09

## 2021-08-09 RX ORDER — SODIUM CHLORIDE 9 MG/ML
500 INJECTION, SOLUTION INTRAVENOUS ONCE
Refills: 0 | Status: COMPLETED | OUTPATIENT
Start: 2021-08-09 | End: 2021-08-09

## 2021-08-09 RX ORDER — DEXTROSE 50 % IN WATER 50 %
15 SYRINGE (ML) INTRAVENOUS ONCE
Refills: 0 | Status: DISCONTINUED | OUTPATIENT
Start: 2021-08-09 | End: 2021-08-10

## 2021-08-09 RX ORDER — ONDANSETRON 8 MG/1
4 TABLET, FILM COATED ORAL EVERY 6 HOURS
Refills: 0 | Status: DISCONTINUED | OUTPATIENT
Start: 2021-08-09 | End: 2021-08-09

## 2021-08-09 RX ORDER — ASCORBIC ACID 60 MG
500 TABLET,CHEWABLE ORAL
Refills: 0 | Status: DISCONTINUED | OUTPATIENT
Start: 2021-08-09 | End: 2021-08-10

## 2021-08-09 RX ORDER — POLYETHYLENE GLYCOL 3350 17 G/17G
17 POWDER, FOR SOLUTION ORAL AT BEDTIME
Refills: 0 | Status: DISCONTINUED | OUTPATIENT
Start: 2021-08-09 | End: 2021-08-10

## 2021-08-09 RX ORDER — ACETAMINOPHEN 500 MG
650 TABLET ORAL EVERY 6 HOURS
Refills: 0 | Status: DISCONTINUED | OUTPATIENT
Start: 2021-08-09 | End: 2021-08-10

## 2021-08-09 RX ORDER — ACETAMINOPHEN 500 MG
1000 TABLET ORAL ONCE
Refills: 0 | Status: COMPLETED | OUTPATIENT
Start: 2021-08-09 | End: 2022-07-08

## 2021-08-09 RX ORDER — INSULIN GLARGINE 100 [IU]/ML
30 INJECTION, SOLUTION SUBCUTANEOUS
Qty: 0 | Refills: 0 | DISCHARGE

## 2021-08-09 RX ORDER — OXYCODONE HYDROCHLORIDE 5 MG/1
1 TABLET ORAL
Qty: 42 | Refills: 0
Start: 2021-08-09 | End: 2021-08-13

## 2021-08-09 RX ORDER — OXYCODONE HYDROCHLORIDE 5 MG/1
5 TABLET ORAL
Refills: 0 | Status: DISCONTINUED | OUTPATIENT
Start: 2021-08-09 | End: 2021-08-09

## 2021-08-09 RX ORDER — INSULIN GLARGINE 100 [IU]/ML
0 INJECTION, SOLUTION SUBCUTANEOUS
Qty: 0 | Refills: 0 | DISCHARGE
Start: 2021-08-09

## 2021-08-09 RX ORDER — INSULIN GLARGINE 100 [IU]/ML
36 INJECTION, SOLUTION SUBCUTANEOUS
Qty: 0 | Refills: 0 | DISCHARGE

## 2021-08-09 RX ORDER — CEFAZOLIN SODIUM 1 G
2000 VIAL (EA) INJECTION EVERY 8 HOURS
Refills: 0 | Status: COMPLETED | OUTPATIENT
Start: 2021-08-09 | End: 2021-08-10

## 2021-08-09 RX ORDER — ACETAMINOPHEN 500 MG
1000 TABLET ORAL ONCE
Refills: 0 | Status: COMPLETED | OUTPATIENT
Start: 2021-08-09 | End: 2021-08-09

## 2021-08-09 RX ORDER — PANTOPRAZOLE SODIUM 20 MG/1
40 TABLET, DELAYED RELEASE ORAL
Refills: 0 | Status: DISCONTINUED | OUTPATIENT
Start: 2021-08-09 | End: 2021-08-09

## 2021-08-09 RX ORDER — DOCUSATE SODIUM 100 MG
1 CAPSULE ORAL
Qty: 14 | Refills: 0
Start: 2021-08-09 | End: 2021-08-15

## 2021-08-09 RX ORDER — CELECOXIB 200 MG/1
200 CAPSULE ORAL ONCE
Refills: 0 | Status: COMPLETED | OUTPATIENT
Start: 2021-08-09 | End: 2021-08-09

## 2021-08-09 RX ORDER — FOLIC ACID 0.8 MG
1 TABLET ORAL DAILY
Refills: 0 | Status: DISCONTINUED | OUTPATIENT
Start: 2021-08-09 | End: 2021-08-10

## 2021-08-09 RX ORDER — SENNA PLUS 8.6 MG/1
2 TABLET ORAL AT BEDTIME
Refills: 0 | Status: DISCONTINUED | OUTPATIENT
Start: 2021-08-09 | End: 2021-08-10

## 2021-08-09 RX ORDER — ONDANSETRON 8 MG/1
1 TABLET, FILM COATED ORAL
Qty: 28 | Refills: 0
Start: 2021-08-09 | End: 2021-08-15

## 2021-08-09 RX ORDER — ASPIRIN/CALCIUM CARB/MAGNESIUM 324 MG
1 TABLET ORAL
Qty: 0 | Refills: 0 | DISCHARGE

## 2021-08-09 RX ORDER — SODIUM CHLORIDE 9 MG/ML
500 INJECTION, SOLUTION INTRAVENOUS ONCE
Refills: 0 | Status: DISCONTINUED | OUTPATIENT
Start: 2021-08-09 | End: 2021-08-09

## 2021-08-09 RX ORDER — HYDROMORPHONE HYDROCHLORIDE 2 MG/ML
0.5 INJECTION INTRAMUSCULAR; INTRAVENOUS; SUBCUTANEOUS
Refills: 0 | Status: DISCONTINUED | OUTPATIENT
Start: 2021-08-09 | End: 2021-08-09

## 2021-08-09 RX ORDER — ACETAMINOPHEN 500 MG
650 TABLET ORAL ONCE
Refills: 0 | Status: COMPLETED | OUTPATIENT
Start: 2021-08-09 | End: 2021-08-09

## 2021-08-09 RX ORDER — MAGNESIUM HYDROXIDE 400 MG/1
30 TABLET, CHEWABLE ORAL DAILY
Refills: 0 | Status: DISCONTINUED | OUTPATIENT
Start: 2021-08-09 | End: 2021-08-09

## 2021-08-09 RX ORDER — ONDANSETRON 8 MG/1
4 TABLET, FILM COATED ORAL ONCE
Refills: 0 | Status: DISCONTINUED | OUTPATIENT
Start: 2021-08-09 | End: 2021-08-09

## 2021-08-09 RX ORDER — DEXTROSE 50 % IN WATER 50 %
25 SYRINGE (ML) INTRAVENOUS ONCE
Refills: 0 | Status: COMPLETED | OUTPATIENT
Start: 2021-08-09 | End: 2021-08-09

## 2021-08-09 RX ORDER — PANTOPRAZOLE SODIUM 20 MG/1
40 TABLET, DELAYED RELEASE ORAL
Refills: 0 | Status: DISCONTINUED | OUTPATIENT
Start: 2021-08-09 | End: 2021-08-10

## 2021-08-09 RX ORDER — ASPIRIN/CALCIUM CARB/MAGNESIUM 324 MG
1 TABLET ORAL
Qty: 56 | Refills: 0
Start: 2021-08-09 | End: 2021-09-05

## 2021-08-09 RX ORDER — ASPIRIN/CALCIUM CARB/MAGNESIUM 324 MG
325 TABLET ORAL
Refills: 0 | Status: DISCONTINUED | OUTPATIENT
Start: 2021-08-09 | End: 2021-08-09

## 2021-08-09 RX ORDER — OXYCODONE HYDROCHLORIDE 5 MG/1
10 TABLET ORAL
Refills: 0 | Status: DISCONTINUED | OUTPATIENT
Start: 2021-08-09 | End: 2021-08-09

## 2021-08-09 RX ORDER — SODIUM CHLORIDE 9 MG/ML
500 INJECTION, SOLUTION INTRAVENOUS ONCE
Refills: 0 | Status: DISCONTINUED | OUTPATIENT
Start: 2021-08-10 | End: 2021-08-09

## 2021-08-09 RX ORDER — INSULIN LISPRO 100/ML
VIAL (ML) SUBCUTANEOUS
Refills: 0 | Status: DISCONTINUED | OUTPATIENT
Start: 2021-08-09 | End: 2021-08-10

## 2021-08-09 RX ORDER — BENZOCAINE AND MENTHOL 5; 1 G/100ML; G/100ML
1 LIQUID ORAL THREE TIMES A DAY
Refills: 0 | Status: DISCONTINUED | OUTPATIENT
Start: 2021-08-09 | End: 2021-08-10

## 2021-08-09 RX ORDER — OXYCODONE HYDROCHLORIDE 5 MG/1
5 TABLET ORAL
Refills: 0 | Status: DISCONTINUED | OUTPATIENT
Start: 2021-08-09 | End: 2021-08-10

## 2021-08-09 RX ORDER — POLYETHYLENE GLYCOL 3350 17 G/17G
17 POWDER, FOR SOLUTION ORAL AT BEDTIME
Refills: 0 | Status: DISCONTINUED | OUTPATIENT
Start: 2021-08-09 | End: 2021-08-09

## 2021-08-09 RX ORDER — SODIUM CHLORIDE 9 MG/ML
3 INJECTION INTRAMUSCULAR; INTRAVENOUS; SUBCUTANEOUS EVERY 8 HOURS
Refills: 0 | Status: DISCONTINUED | OUTPATIENT
Start: 2021-08-09 | End: 2021-08-09

## 2021-08-09 RX ORDER — SENNA PLUS 8.6 MG/1
2 TABLET ORAL AT BEDTIME
Refills: 0 | Status: DISCONTINUED | OUTPATIENT
Start: 2021-08-09 | End: 2021-08-09

## 2021-08-09 RX ADMIN — CELECOXIB 200 MILLIGRAM(S): 200 CAPSULE ORAL at 07:58

## 2021-08-09 RX ADMIN — Medication 3 MILLIGRAM(S): at 22:54

## 2021-08-09 RX ADMIN — Medication 100 MILLIGRAM(S): at 16:05

## 2021-08-09 RX ADMIN — OXYCODONE HYDROCHLORIDE 10 MILLIGRAM(S): 5 TABLET ORAL at 23:19

## 2021-08-09 RX ADMIN — Medication 3: at 17:46

## 2021-08-09 RX ADMIN — Medication 400 MILLIGRAM(S): at 15:02

## 2021-08-09 RX ADMIN — Medication 15 MILLIGRAM(S): at 18:00

## 2021-08-09 RX ADMIN — Medication 1000 MILLIGRAM(S): at 15:30

## 2021-08-09 RX ADMIN — SODIUM CHLORIDE 75 MILLILITER(S): 9 INJECTION, SOLUTION INTRAVENOUS at 14:38

## 2021-08-09 RX ADMIN — OXYCODONE HYDROCHLORIDE 10 MILLIGRAM(S): 5 TABLET ORAL at 22:19

## 2021-08-09 RX ADMIN — Medication 1 TABLET(S): at 14:37

## 2021-08-09 RX ADMIN — Medication 1: at 22:19

## 2021-08-09 RX ADMIN — OXYCODONE HYDROCHLORIDE 10 MILLIGRAM(S): 5 TABLET ORAL at 15:30

## 2021-08-09 RX ADMIN — GABAPENTIN 300 MILLIGRAM(S): 400 CAPSULE ORAL at 14:37

## 2021-08-09 RX ADMIN — OXYCODONE HYDROCHLORIDE 10 MILLIGRAM(S): 5 TABLET ORAL at 14:38

## 2021-08-09 RX ADMIN — SODIUM CHLORIDE 500 MILLILITER(S): 9 INJECTION, SOLUTION INTRAVENOUS at 12:18

## 2021-08-09 RX ADMIN — SENNA PLUS 2 TABLET(S): 8.6 TABLET ORAL at 22:55

## 2021-08-09 RX ADMIN — POLYETHYLENE GLYCOL 3350 17 GRAM(S): 17 POWDER, FOR SOLUTION ORAL at 22:55

## 2021-08-09 RX ADMIN — Medication 650 MILLIGRAM(S): at 07:58

## 2021-08-09 RX ADMIN — Medication 500 MILLIGRAM(S): at 17:46

## 2021-08-09 RX ADMIN — GABAPENTIN 300 MILLIGRAM(S): 400 CAPSULE ORAL at 22:54

## 2021-08-09 RX ADMIN — Medication 1 TABLET(S): at 22:54

## 2021-08-09 RX ADMIN — Medication 25 GRAM(S): at 07:54

## 2021-08-09 RX ADMIN — Medication 15 MILLIGRAM(S): at 17:46

## 2021-08-09 NOTE — PHYSICAL THERAPY INITIAL EVALUATION ADULT - ASSISTIVE DEVICE FOR TRANSFER: GAIT, REHAB EVAL
Problem: CARDIOVASCULAR - ADULT  Goal: Maintains optimal cardiac output and hemodynamic stability  INTERVENTIONS:  - Monitor vital signs, rhythm, and trends  - Monitor for bleeding, hypotension and signs of decreased cardiac output  - Evaluate effectivenes appropriate pain scale  - Administer analgesics based on type and severity of pain and evaluate response  - Implement non-pharmacological measures as appropriate and evaluate response  - Consider cultural and social influences on pain and pain management rolling walker

## 2021-08-09 NOTE — OCCUPATIONAL THERAPY INITIAL EVALUATION ADULT - ADDITIONAL COMMENTS
Pt lives in a private home c  and granddaughter (able to assist upon d/c) c 6 stairs to enter c bilateral handrails. Once inside, pt has a full flight to reach bedroom but will reside on the main level in living room post-operatively. Pt bathroom has walk-in shower, retractable shower head, and standard height toilet seat. Pt reported she will use 3:1 commode in upstairs bathroom. Pt is ambidextrous (alternates R/L hand depending on task), wears glasses for reading & distance, and currently does not drive.

## 2021-08-09 NOTE — PHYSICAL THERAPY INITIAL EVALUATION ADULT - GAIT TRAINING, PT EVAL
Patient will ambulate 500 feet with rolling walker independently for community ambulation in 1-2 weeks.

## 2021-08-09 NOTE — PHYSICAL THERAPY INITIAL EVALUATION ADULT - BALANCE TRAINING, PT EVAL
Patient will be normal in static and dynamic standing balance with rolling walker in 1-2 weeks to improve safety and decrease risk of falls.

## 2021-08-09 NOTE — DISCHARGE NOTE PROVIDER - NSDCFUADDINST_GEN_ALL_CORE_FT
1.	Pain Control  2.	Walking with full weight bearing as tolerated, with assistive devices (walker/Cane as Needed)  3.	DVT Prophylaxis, Aspirin 325 mg twice a day for 30 days. do not skip doses.  4.	PT as needed  5.	Follow up with Dr. Hernandez Outpatient in 10-14 Days after Discharge from the Hospital or Rehab. Call Office For Appointment. [phone]  6.	Remove Staples Post-Op Day 14, and will Remove Dressing at the office.  7.	Ice/Elevate affected area as Needed  8.	Keep Dressing Clean and dry.   1.	Pain Control  2.	Walking with full weight bearing as tolerated, with assistive devices (walker/Cane as Needed)  3.	DVT Prophylaxis, Aspirin 325 mg twice a day for 30 days. do not skip doses. Following 30 days, patient will return to taking her normal ASA 81mg daily per normal home regimen  4.	PT as needed  5.	Follow up with Dr. Hernandez Outpatient in 10-14 Days after Discharge from the Hospital or Rehab. Call Office For Appointment. [phone]  6.	Remove Staples Post-Op Day 14, and will Remove Dressing at the office.  7.	Ice/Elevate affected area as Needed  8.	Keep Dressing Clean and dry.

## 2021-08-09 NOTE — PHYSICAL THERAPY INITIAL EVALUATION ADULT - ACTIVE RANGE OF MOTION EXAMINATION, REHAB EVAL
L knee 5-70 due to pain/bilateral upper extremity Active ROM was WFL (within functional limits)/bilateral  lower extremity Active ROM was WFL (within functional limits)/deficits as listed below

## 2021-08-09 NOTE — OCCUPATIONAL THERAPY INITIAL EVALUATION ADULT - MANUAL MUSCLE TESTING RESULTS, REHAB EVAL
bilateral UE grossly 4/5, Right LE grossly greater than or equal to 3/5, Left LE grossly less than or equal to 3/5

## 2021-08-09 NOTE — PHYSICAL THERAPY INITIAL EVALUATION ADULT - ADDITIONAL COMMENTS
As per pre-op and reviewed with patient POD #0: There are 6 steps, c far blu rails, at the entry of the house and one flight of steps to negotiate to go to the second floor bedroom. However, pt will set up a bed in the living room and stay on the main floor after surgery.  Pt has a walk in shower c retractable shower head,  no grab bar, and regular toilet seat in BR. 3-1 commode will fit in BR on the second floor. Patient has rolling walker, 3:1 commode, cane and raised toilet seat with arms. Pt has no experience of adverse effects with pain medication. Pt is not on out-pt physical therapy at present. There is no h/o fall but L  knee marcellus sometimes. Pt wears glasses for reading and distance and no need for hearing aid. Pt does not drive at present.

## 2021-08-09 NOTE — DISCHARGE NOTE PROVIDER - NSDCMRMEDTOKEN_GEN_ALL_CORE_FT
Adult Aspirin 325 mg oral tablet: 1 tab(s) orally once a day   atorvastatin: 1 tab(s) orally once a day  gabapentin 300 mg oral capsule: 1 cap(s) orally 3 times a day  metFORMIN 1000 mg oral tablet: 1 tab(s) orally 2 times a day   Adult Aspirin 325 mg oral tablet: 1 tab(s) orally once a day   atorvastatin: 1 tab(s) orally once a day  gabapentin 300 mg oral capsule: 1 cap(s) orally 3 times a day  insulin glargine:   metFORMIN 1000 mg oral tablet: 1 tab(s) orally 2 times a day   Adult Aspirin 325 mg oral tablet: 1 tab(s) orally 2 times a day   atorvastatin: 1 tab(s) orally once a day  Colace 100 mg oral capsule: 1 cap(s) orally 2 times a day, As Needed -for constipation   gabapentin 300 mg oral capsule: 1 cap(s) orally 3 times a day  insulin glargine:   metFORMIN 1000 mg oral tablet: 1 tab(s) orally 2 times a day  oxyCODONE 5 mg oral capsule: 1 cap(s) orally every 6 hours -for severe pain MDD:4  Zofran 4 mg oral tablet: 1 tab(s) orally every 6 hours

## 2021-08-09 NOTE — OCCUPATIONAL THERAPY INITIAL EVALUATION ADULT - GENERAL OBSERVATIONS, REHAB EVAL
Pt encountered semisupine in bed, NAD, AXOX4, +ACE L knee c/d/i, +heplock, +TEDs. Pt c/o pain s/p left TKA. Pt tracee at bedside, PT Gabi/PTS Andrew present.

## 2021-08-09 NOTE — OCCUPATIONAL THERAPY INITIAL EVALUATION ADULT - ANTICIPATED DISCHARGE DISPOSITION, OT EVAL
STR to prevent falls, improve balance, muscle strength, and endurance in order for the pt to perform ADL management and functional mobility in a safe manner. Pt has rolling walker and 3:1 commode.

## 2021-08-09 NOTE — DISCHARGE NOTE PROVIDER - HOSPITAL COURSE
Hospital Course:  The patient is a 71y Female status post elective Total Knee Arthroplasty to the Left knee after failing outpatient nonoperative conservative management. Patient presented to [facility] after being medically cleared for an elective surgical procedure. The patient was taken to the operating room on date mentioned above. Prophylactic antibiotics were started before the procedure and continued for 24 hours. There were no complications during the procedure and patient tolerated the procedure well. The patient was transferred to the recovery room in stable condition and subsequently to the surgical floor. The patient was placed on ASA 325mg PO BID for a total of 30 days. All home medications were continued. The patient received physical therapy daily and daily labs were followed. The dressing was kept clean, dry, intact. The rest of the hospital stay was unremarkable. Hospital Course:  The patient is a 71y Female status post elective Total Knee Arthroplasty to the Left knee after failing outpatient nonoperative conservative management. Patient presented to [facility] after being medically cleared for an elective surgical procedure. The patient was taken to the operating room on date mentioned above. Prophylactic antibiotics were started before the procedure and continued for 24 hours. There were no complications during the procedure and patient tolerated the procedure well. The patient was transferred to the recovery room in stable condition and subsequently to the surgical floor. The patient was placed on ASA 325mg PO BID for a total of 30 days, after which patient will return to taking ASA 81mg daily per her normal regimen. All home medications were continued. The patient received physical therapy daily and daily labs were followed. The dressing was kept clean, dry, intact. The rest of the hospital stay was unremarkable.

## 2021-08-09 NOTE — DISCHARGE NOTE PROVIDER - CARE PROVIDER_API CALL
Rashad Hernandez (DO)  Orthopaedic Surgery  [address] [place] [address]  Phone: [phone]  Fax: [phone]  Follow Up Time:

## 2021-08-10 VITALS
DIASTOLIC BLOOD PRESSURE: 87 MMHG | SYSTOLIC BLOOD PRESSURE: 147 MMHG | TEMPERATURE: 99 F | OXYGEN SATURATION: 96 % | HEART RATE: 89 BPM | RESPIRATION RATE: 17 BRPM

## 2021-08-10 LAB
ANION GAP SERPL CALC-SCNC: 7 MMOL/L — SIGNIFICANT CHANGE UP (ref 5–17)
BUN SERPL-MCNC: 23 MG/DL — SIGNIFICANT CHANGE UP (ref 7–23)
CALCIUM SERPL-MCNC: 8.9 MG/DL — SIGNIFICANT CHANGE UP (ref 8.5–10.1)
CHLORIDE SERPL-SCNC: 104 MMOL/L — SIGNIFICANT CHANGE UP (ref 96–108)
CO2 SERPL-SCNC: 28 MMOL/L — SIGNIFICANT CHANGE UP (ref 22–31)
COVID-19 SPIKE DOMAIN AB INTERP: POSITIVE
COVID-19 SPIKE DOMAIN ANTIBODY RESULT: >250 U/ML — HIGH
CREAT SERPL-MCNC: 0.89 MG/DL — SIGNIFICANT CHANGE UP (ref 0.5–1.3)
GLUCOSE BLDC GLUCOMTR-MCNC: 165 MG/DL — HIGH (ref 70–99)
GLUCOSE BLDC GLUCOMTR-MCNC: 194 MG/DL — HIGH (ref 70–99)
GLUCOSE BLDC GLUCOMTR-MCNC: 219 MG/DL — HIGH (ref 70–99)
GLUCOSE SERPL-MCNC: 152 MG/DL — HIGH (ref 70–99)
HCT VFR BLD CALC: 31.3 % — LOW (ref 34.5–45)
HGB BLD-MCNC: 10.3 G/DL — LOW (ref 11.5–15.5)
MCHC RBC-ENTMCNC: 28.9 PG — SIGNIFICANT CHANGE UP (ref 27–34)
MCHC RBC-ENTMCNC: 32.9 GM/DL — SIGNIFICANT CHANGE UP (ref 32–36)
MCV RBC AUTO: 87.7 FL — SIGNIFICANT CHANGE UP (ref 80–100)
NRBC # BLD: 0 /100 WBCS — SIGNIFICANT CHANGE UP (ref 0–0)
PLATELET # BLD AUTO: 191 K/UL — SIGNIFICANT CHANGE UP (ref 150–400)
POTASSIUM SERPL-MCNC: 4.5 MMOL/L — SIGNIFICANT CHANGE UP (ref 3.5–5.3)
POTASSIUM SERPL-SCNC: 4.5 MMOL/L — SIGNIFICANT CHANGE UP (ref 3.5–5.3)
RBC # BLD: 3.57 M/UL — LOW (ref 3.8–5.2)
RBC # FLD: 14.6 % — HIGH (ref 10.3–14.5)
SARS-COV-2 IGG+IGM SERPL QL IA: >250 U/ML — HIGH
SARS-COV-2 IGG+IGM SERPL QL IA: POSITIVE
SODIUM SERPL-SCNC: 139 MMOL/L — SIGNIFICANT CHANGE UP (ref 135–145)
WBC # BLD: 8.9 K/UL — SIGNIFICANT CHANGE UP (ref 3.8–10.5)
WBC # FLD AUTO: 8.9 K/UL — SIGNIFICANT CHANGE UP (ref 3.8–10.5)

## 2021-08-10 RX ORDER — ATORVASTATIN CALCIUM 80 MG/1
1 TABLET, FILM COATED ORAL
Qty: 0 | Refills: 0 | DISCHARGE

## 2021-08-10 RX ORDER — ASPIRIN/CALCIUM CARB/MAGNESIUM 324 MG
1 TABLET ORAL
Qty: 60 | Refills: 0
Start: 2021-08-10 | End: 2021-09-08

## 2021-08-10 RX ORDER — OXYCODONE HYDROCHLORIDE 5 MG/1
1 TABLET ORAL
Qty: 30 | Refills: 0
Start: 2021-08-10 | End: 2021-08-14

## 2021-08-10 RX ORDER — METFORMIN HYDROCHLORIDE 850 MG/1
1 TABLET ORAL
Qty: 0 | Refills: 0 | DISCHARGE

## 2021-08-10 RX ORDER — OXYCODONE HYDROCHLORIDE 5 MG/1
1 TABLET ORAL
Qty: 42 | Refills: 0
Start: 2021-08-10 | End: 2021-08-14

## 2021-08-10 RX ORDER — ASPIRIN/CALCIUM CARB/MAGNESIUM 324 MG
1 TABLET ORAL
Qty: 56 | Refills: 0
Start: 2021-08-10 | End: 2021-09-06

## 2021-08-10 RX ORDER — GABAPENTIN 400 MG/1
1 CAPSULE ORAL
Qty: 0 | Refills: 0 | DISCHARGE

## 2021-08-10 RX ORDER — ACETAMINOPHEN 500 MG
3 TABLET ORAL
Qty: 90 | Refills: 0
Start: 2021-08-10 | End: 2021-08-19

## 2021-08-10 RX ORDER — ACETAMINOPHEN 500 MG
1000 TABLET ORAL ONCE
Refills: 0 | Status: COMPLETED | OUTPATIENT
Start: 2021-08-10 | End: 2021-08-10

## 2021-08-10 RX ORDER — ONDANSETRON 8 MG/1
1 TABLET, FILM COATED ORAL
Qty: 20 | Refills: 0
Start: 2021-08-10 | End: 2021-08-19

## 2021-08-10 RX ORDER — DOCUSATE SODIUM 100 MG
1 CAPSULE ORAL
Qty: 28 | Refills: 0
Start: 2021-08-10 | End: 2021-08-23

## 2021-08-10 RX ORDER — DOCUSATE SODIUM 100 MG
1 CAPSULE ORAL
Qty: 14 | Refills: 0
Start: 2021-08-10 | End: 2021-08-16

## 2021-08-10 RX ORDER — ONDANSETRON 8 MG/1
1 TABLET, FILM COATED ORAL
Qty: 28 | Refills: 0
Start: 2021-08-10 | End: 2021-08-16

## 2021-08-10 RX ADMIN — Medication 325 MILLIGRAM(S): at 17:33

## 2021-08-10 RX ADMIN — Medication 100 MILLIGRAM(S): at 00:54

## 2021-08-10 RX ADMIN — GABAPENTIN 300 MILLIGRAM(S): 400 CAPSULE ORAL at 13:33

## 2021-08-10 RX ADMIN — Medication 500 MILLIGRAM(S): at 17:33

## 2021-08-10 RX ADMIN — Medication 325 MILLIGRAM(S): at 05:53

## 2021-08-10 RX ADMIN — GABAPENTIN 300 MILLIGRAM(S): 400 CAPSULE ORAL at 05:53

## 2021-08-10 RX ADMIN — Medication 15 MILLIGRAM(S): at 06:07

## 2021-08-10 RX ADMIN — OXYCODONE HYDROCHLORIDE 10 MILLIGRAM(S): 5 TABLET ORAL at 13:33

## 2021-08-10 RX ADMIN — Medication 1: at 16:11

## 2021-08-10 RX ADMIN — CELECOXIB 200 MILLIGRAM(S): 200 CAPSULE ORAL at 17:33

## 2021-08-10 RX ADMIN — Medication 500 MILLIGRAM(S): at 05:53

## 2021-08-10 RX ADMIN — Medication 1 TABLET(S): at 13:33

## 2021-08-10 RX ADMIN — Medication 1 TABLET(S): at 05:53

## 2021-08-10 RX ADMIN — Medication 15 MILLIGRAM(S): at 11:25

## 2021-08-10 RX ADMIN — OXYCODONE HYDROCHLORIDE 10 MILLIGRAM(S): 5 TABLET ORAL at 10:15

## 2021-08-10 RX ADMIN — Medication 15 MILLIGRAM(S): at 00:54

## 2021-08-10 RX ADMIN — PANTOPRAZOLE SODIUM 40 MILLIGRAM(S): 20 TABLET, DELAYED RELEASE ORAL at 05:54

## 2021-08-10 RX ADMIN — Medication 15 MILLIGRAM(S): at 05:54

## 2021-08-10 RX ADMIN — CELECOXIB 200 MILLIGRAM(S): 200 CAPSULE ORAL at 06:33

## 2021-08-10 RX ADMIN — Medication 1 TABLET(S): at 11:26

## 2021-08-10 RX ADMIN — Medication 1000 MILLIGRAM(S): at 10:15

## 2021-08-10 RX ADMIN — Medication 1 MILLIGRAM(S): at 11:26

## 2021-08-10 RX ADMIN — Medication 15 MILLIGRAM(S): at 01:07

## 2021-08-10 RX ADMIN — Medication 15 MILLIGRAM(S): at 12:23

## 2021-08-10 RX ADMIN — CELECOXIB 200 MILLIGRAM(S): 200 CAPSULE ORAL at 05:53

## 2021-08-10 RX ADMIN — Medication 1: at 07:39

## 2021-08-10 RX ADMIN — Medication 400 MILLIGRAM(S): at 09:23

## 2021-08-10 RX ADMIN — Medication 2: at 11:26

## 2021-08-10 RX ADMIN — OXYCODONE HYDROCHLORIDE 10 MILLIGRAM(S): 5 TABLET ORAL at 18:26

## 2021-08-10 RX ADMIN — OXYCODONE HYDROCHLORIDE 10 MILLIGRAM(S): 5 TABLET ORAL at 09:23

## 2021-08-10 NOTE — PROGRESS NOTE ADULT - SUBJECTIVE AND OBJECTIVE BOX
Orthopedics Post-op Check    POD 0 Left Total Knee Arthroplasty  Pt Seen and Examined. Pain is controlled. Feeling well overall. No nausea or vomiting.     Vital Signs Last 24 Hrs  T(C): 36.6 (08-09-21 @ 12:34), Max: 36.9 (08-09-21 @ 07:10)  T(F): 97.9 (08-09-21 @ 12:34), Max: 98.5 (08-09-21 @ 07:10)  HR: 82 (08-09-21 @ 12:34) (73 - 82)  BP: 161/88 (08-09-21 @ 12:34) (137/79 - 166/89)  BP(mean): --  RR: 15 (08-09-21 @ 12:34) (10 - 17)  SpO2: 100% (08-09-21 @ 12:34) (96% - 100%)                        11.5   9.69  )-----------( 192      ( 09 Aug 2021 11:56 )             37.3     09 Aug 2021 11:56    141    |  108    |  10     ----------------------------<  76     5.0     |  27     |  0.71     Ca    8.5        09 Aug 2021 11:56          Exam:  NAD AAOx3  Dressing clean and dry  +EHL FHL TA GS  SILT toes 1-5  +DP  Calf Soft NT    A/P:   71y Female s/p L Total Knee Arthroplasty    -Pain control  -DVT/PE ppx  - WBAT/PT/OOB  -Med Mgmt  - FU Labs  - D/C Planning pending PT evaluation 
Orthopedics Post-op Check    POD 1 Left Total Knee Arthroplasty  Pt Seen and Examined. Pain is controlled at this time. Reports she had severe pain while walking with PT and was unable to do much but reports she would like to go home rather than to rehab today. Discussed with her that we will see how she does with PT today. Feeling well overall. No nausea or vomiting.     Vital Signs Last 24 Hrs  T(C): 36.4 (10 Aug 2021 06:00), Max: 36.9 (09 Aug 2021 07:10)  T(F): 97.5 (10 Aug 2021 06:00), Max: 98.5 (09 Aug 2021 07:10)  HR: 85 (10 Aug 2021 06:00) (73 - 97)  BP: 148/83 (10 Aug 2021 06:00) (129/90 - 186/85)  BP(mean): --  RR: 16 (10 Aug 2021 06:00) (10 - 18)  SpO2: 96% (10 Aug 2021 06:00) (94% - 100%)    LABS:                        10.3   8.90  )-----------( 191      ( 10 Aug 2021 06:27 )             31.3     09 Aug 2021 11:56    141    |  108    |  10     ----------------------------<  76     5.0     |  27     |  0.71     Ca    8.5        09 Aug 2021 11:56        Exam:  NAD AAOx3  Dressing clean and dry  +EHL FHL TA GS  SILT toes 1-5  +DP  Calf Soft NT    A/P:   71y Female s/p L Total Knee Arthroplasty    -Pain control  -DVT/PE ppx  - WBAT/PT/OOB  -Med Mgmt  - FU Labs  - D/C Planning PT recommending rehab however patient requesting home will re-assess with PT this AM for safe d/c home

## 2021-08-10 NOTE — DISCHARGE NOTE NURSING/CASE MANAGEMENT/SOCIAL WORK - PATIENT PORTAL LINK FT
You can access the FollowMyHealth Patient Portal offered by [facility] by registering at the following website: [email or web address]. By joining [employer]’s FollowMyHealth portal, you will also be able to view your health information using other applications (apps) compatible with our system.

## 2021-08-11 LAB — SURGICAL PATHOLOGY STUDY: SIGNIFICANT CHANGE UP

## 2021-08-16 DIAGNOSIS — K21.9 GASTRO-ESOPHAGEAL REFLUX DISEASE WITHOUT ESOPHAGITIS: ICD-10-CM

## 2021-08-16 DIAGNOSIS — E11.42 TYPE 2 DIABETES MELLITUS WITH DIABETIC POLYNEUROPATHY: ICD-10-CM

## 2021-08-16 DIAGNOSIS — M17.12 UNILATERAL PRIMARY OSTEOARTHRITIS, LEFT KNEE: ICD-10-CM

## 2021-08-16 DIAGNOSIS — I10 ESSENTIAL (PRIMARY) HYPERTENSION: ICD-10-CM

## 2021-08-16 DIAGNOSIS — E78.5 HYPERLIPIDEMIA, UNSPECIFIED: ICD-10-CM

## 2021-08-16 DIAGNOSIS — Z79.84 LONG TERM (CURRENT) USE OF ORAL HYPOGLYCEMIC DRUGS: ICD-10-CM

## 2022-11-11 NOTE — PATIENT PROFILE ADULT - FUNCTIONAL SCREEN CURRENT LEVEL: SWALLOWING (IF SCORE 2 OR MORE FOR ANY ITEM, CONSULT REHAB SERVICES), MLM)
images of left breast obtained.  
Manual pressure to site.  Patient tolerated procedure well.  Discharge instructions reviewed with patient verbalizing understanding.  
0 = swallows foods/liquids without difficulty

## 2023-11-14 NOTE — H&P PST ADULT - ADMIT DATE
Occupational Therapy Evaluation    Visit Type: Initial Evaluation  Visit: Visit count could not be calculated. Make sure you are using a visit which is associated with an episode.  Referring Provider: Linda Lujan DO  Ot next provider: PRN.  [phone])  Therapeutic Activity (90106)  Fluidotherapy/Whirlpool (22795)  Laser  Ultrasound (10717)  Heat/Cold  (76925)  Therapeutic Exercise (98353)  Paraffin (84119)  12-Jul-2021